# Patient Record
(demographics unavailable — no encounter records)

---

## 2020-04-23 NOTE — NUR
2007- PT AMBULATORY TO ROOM LR 5 COMPLAINING OF VAGINAL BLEEDING, CHANGED INTO
GOWN.
2010- EFM X2 APPLIED, VSS, NEGATIVE COVID SCREENING. PT STATES SHE HAD AN
EPISODE OF VAGINAL BLEEDING AFTER BOWEL MOVEMENT. STATES SHE IS POSITIVE IT
WAS VAGINAL AND NOT RECTAL. WAS BRIGHT RED ON TOILET PAPER. HAS SLOWED DOWN TO
SPOTTING NOW. SHE STATES SHE HAD MINIMAL AMOUNT ON TOILET PAPER HERE IN LDR.
DENIES CONTRACTIONS, LEAKING FLUID. STATES SHE IS FEELING BABY MOVE.
2020- DR BAUTISTA AT DESK. UPDATED ON PT HISTORY AND PRESENTING COMPLAINT. ORDER
FOR VAGINAL EXAM, REACTIVE 20MIN STRIP, AND THEN CAN DISMISS HOME.
2025- REPORT OFF TO CHARGE NURSE.

## 2020-04-23 NOTE — NUR
Report received on labor check. Pt states she had a bowel movement and there
was "a large amount of bright red blood that was from her vagina." Pt states
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 Pt
states last time she went to bathroom bleeding has decreased and there was
barely any blood noted. Pt reports good fetal movement. Denies feeling
contractions. SVE FT/20/-2, bloody show noted on exam glove, light tinged.
Assessment completed. Plan of care explained to pt. Questions answered.

## 2020-04-23 NOTE — NUR
updated on pts status. See physican notification.  at nurses
station reviewing FHR strip.
2120:  updated on pt. See physican notification. New order received.
Pt updated on new plan of care. Denies questions at this time
2137: SVE unchanged. Small amount of bloody show noted on exam glove, less
then last exam.
2156: Pt off monitors and to change clothes.
2205: Discharge instructions given to pt and  who verbalize
understanding. Pt denies questions at this time. Pt ambulatory off unit with
.